# Patient Record
Sex: FEMALE | Race: WHITE | Employment: UNEMPLOYED | URBAN - METROPOLITAN AREA
[De-identification: names, ages, dates, MRNs, and addresses within clinical notes are randomized per-mention and may not be internally consistent; named-entity substitution may affect disease eponyms.]

---

## 2017-11-06 ENCOUNTER — HOSPITAL ENCOUNTER (OUTPATIENT)
Dept: GENERAL RADIOLOGY | Age: 62
Discharge: HOME | End: 2017-11-06
Attending: UROLOGY
Payer: MEDICARE

## 2017-11-06 ENCOUNTER — HOSPITAL ENCOUNTER (OUTPATIENT)
Dept: PREADMISSION TESTING | Age: 62
Discharge: HOME | End: 2017-11-06
Payer: MEDICARE

## 2017-11-06 VITALS — WEIGHT: 228 LBS | BODY MASS INDEX: 38.93 KG/M2 | HEIGHT: 64 IN

## 2017-11-06 LAB
ANION GAP SERPL CALC-SCNC: 7 MMOL/L (ref 3–18)
BUN SERPL-MCNC: 11 MG/DL (ref 7–18)
BUN/CREAT SERPL: 12 (ref 12–20)
CALCIUM SERPL-MCNC: 8.8 MG/DL (ref 8.5–10.1)
CHLORIDE SERPL-SCNC: 104 MMOL/L (ref 100–108)
CO2 SERPL-SCNC: 30 MMOL/L (ref 21–32)
CREAT SERPL-MCNC: 0.93 MG/DL (ref 0.6–1.3)
EST. AVERAGE GLUCOSE BLD GHB EST-MCNC: 103 MG/DL
GLUCOSE SERPL-MCNC: 121 MG/DL (ref 74–99)
HBA1C MFR BLD: 5.2 % (ref 4.5–5.6)
HCT VFR BLD AUTO: 40.9 % (ref 35–45)
HGB BLD-MCNC: 13.3 G/DL (ref 12–16)
POTASSIUM SERPL-SCNC: 4.4 MMOL/L (ref 3.5–5.5)
SODIUM SERPL-SCNC: 141 MMOL/L (ref 136–145)

## 2017-11-06 PROCEDURE — 80048 BASIC METABOLIC PNL TOTAL CA: CPT | Performed by: UROLOGY

## 2017-11-06 PROCEDURE — 83036 HEMOGLOBIN GLYCOSYLATED A1C: CPT | Performed by: UROLOGY

## 2017-11-06 PROCEDURE — 85018 HEMOGLOBIN: CPT | Performed by: UROLOGY

## 2017-11-06 PROCEDURE — 71010 XR CHEST SNGL V: CPT

## 2017-11-06 PROCEDURE — 93005 ELECTROCARDIOGRAM TRACING: CPT

## 2017-11-06 RX ORDER — IPRATROPIUM BROMIDE 0.5 MG/2.5ML
SOLUTION RESPIRATORY (INHALATION) 4 TIMES DAILY
COMMUNITY
Start: 2015-12-17

## 2017-11-06 RX ORDER — OXYCODONE AND ACETAMINOPHEN 5; 325 MG/1; MG/1
1 TABLET ORAL
COMMUNITY
Start: 2017-04-13

## 2017-11-06 RX ORDER — GABAPENTIN 600 MG/1
1 TABLET ORAL
COMMUNITY
Start: 2017-05-01

## 2017-11-06 RX ORDER — SODIUM CHLORIDE, SODIUM LACTATE, POTASSIUM CHLORIDE, CALCIUM CHLORIDE 600; 310; 30; 20 MG/100ML; MG/100ML; MG/100ML; MG/100ML
125 INJECTION, SOLUTION INTRAVENOUS CONTINUOUS
Status: CANCELLED | OUTPATIENT
Start: 2017-11-06

## 2017-11-06 RX ORDER — ESOMEPRAZOLE MAGNESIUM 40 MG/1
1 CAPSULE, DELAYED RELEASE ORAL DAILY
COMMUNITY
Start: 2013-02-05

## 2017-11-06 RX ORDER — METFORMIN HYDROCHLORIDE 500 MG/1
500 TABLET ORAL DAILY
COMMUNITY

## 2017-11-06 RX ORDER — FLUOXETINE 10 MG/1
10 TABLET ORAL DAILY
COMMUNITY

## 2017-11-06 RX ORDER — ALBUTEROL SULFATE 0.83 MG/ML
3 SOLUTION RESPIRATORY (INHALATION)
COMMUNITY
Start: 2016-02-03

## 2017-11-06 RX ORDER — BUDESONIDE AND FORMOTEROL FUMARATE DIHYDRATE 160; 4.5 UG/1; UG/1
2 AEROSOL RESPIRATORY (INHALATION) 2 TIMES DAILY
COMMUNITY
Start: 2017-05-01

## 2017-11-06 RX ORDER — CEFAZOLIN SODIUM 2 G/50ML
2 SOLUTION INTRAVENOUS ONCE
Status: CANCELLED | OUTPATIENT
Start: 2017-11-06 | End: 2017-11-06

## 2017-11-06 RX ORDER — LEVALBUTEROL TARTRATE 45 UG/1
2 AEROSOL, METERED ORAL 2 TIMES DAILY
COMMUNITY
Start: 2017-05-01

## 2017-11-06 RX ORDER — NALOXONE HYDROCHLORIDE 4 MG/.1ML
SPRAY NASAL
COMMUNITY
Start: 2017-06-21

## 2017-11-06 NOTE — PERIOP NOTES
Pt. Denies personal or family history of problems with anesthesia. Denies sleep apnea. No CPAP. Pt is independent. Spec Pop done. Requested last office note from PCP. Pulmonary notes in Care Everywhere.

## 2017-11-07 LAB
ATRIAL RATE: 90 BPM
CALCULATED P AXIS, ECG09: 71 DEGREES
CALCULATED R AXIS, ECG10: 6 DEGREES
CALCULATED T AXIS, ECG11: 62 DEGREES
DIAGNOSIS, 93000: NORMAL
P-R INTERVAL, ECG05: 168 MS
Q-T INTERVAL, ECG07: 384 MS
QRS DURATION, ECG06: 100 MS
QTC CALCULATION (BEZET), ECG08: 469 MS
VENTRICULAR RATE, ECG03: 90 BPM

## 2017-11-08 NOTE — PERIOP NOTES
Left v/m for Lynn regarding information faxed over to the office regarding anesthesia's request. Leona Dawson's note from 05/01 and chest xray report. Spoke with Hannah Blank, we reviewed the information faxed on patient. Patient has an appointment in December with Dr. Yeni Patel, if patient cannot get in sooner she will need to be rescheduled. Elias Xavier will call patient.

## 2020-08-18 ENCOUNTER — NURSE NAVIGATOR (OUTPATIENT)
Dept: OTHER | Age: 65
End: 2020-08-18

## 2020-08-18 NOTE — NURSE NAVIGATOR
LMOVM regarding prescreening for LDCT lung cancer screening scheduled for 8/21/2020.  Prescreening must be complete prior to exam.      GELY MitchellN, RN, [id number] [address] [facility] Nurse Navigator

## 2020-08-19 NOTE — NURSE NAVIGATOR
Referring Provider: Jeevan Medina MD      Lung Cancer Risk Profile:   Age: 72  Gender: Female  Height: 65\"  Weight: 250#    Smoking History:  Smoking Status: current use  # years smokin  # years quit: 0  Packs/day: 1.5  Pack years: 66    Patient discussed smoking cessation with PCP: Yes, per provider order    Patient participated in shared decision making process with PCP: Yes, per provider note    Patient is currently experiencing symptoms: No, per patient report    If yes what symptoms:     Co-Morbidities:  COPD, CHF    Cancer History:      Additional Risk Factors:         Patient's smoking history discussed via phone. Patient meets LDCT lung cancer screening criteria. Appointment scheduled for 2020 verified with Ms. Myers.       GELY JonesN, RN, OCN  Lung Health Nurse Navigator

## 2020-08-19 NOTE — NURSE NAVIGATOR
Unable to leave msg on mobile VM regarding prescreening for LDCT lung cacner screening.       GELY FuentesN, RN, OCN  Lung Health Nurse Navigator